# Patient Record
Sex: MALE | Race: BLACK OR AFRICAN AMERICAN | NOT HISPANIC OR LATINO | URBAN - METROPOLITAN AREA
[De-identification: names, ages, dates, MRNs, and addresses within clinical notes are randomized per-mention and may not be internally consistent; named-entity substitution may affect disease eponyms.]

---
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
## 2022-12-22 NOTE — ED PROVIDER NOTE - CROS ED RESP ALL NEG
The primary encounter diagnosis was Unspecified mood (affective) disorder (CMS/HCC). A diagnosis of ANA (generalized anxiety disorder) was also pertinent to this visit.  Current symptoms: Feels he has been very stable on his current medication. Still trying to get custody of his childern  Mental status: Patient is well oriented. There is no evidence of psychotic thinking.  Patient does not appear to be a danger to self or others at this time.  No extra pyramidal signs reported.  Side effects: None  Labs: None  Assessment: Stable on current medication.  Plan: Continue current medication. And patient to chart mood over next three months  Current medications: lamotrigine 100mg and sertraline 50mg         This visit is being performed virtually via Video visit using [employer].  Clinical Location: Home  Patient's location Home and is physically present in  [facility] at the time of this visit.    Twenty five minutes were spent on this encounter     negative...